# Patient Record
(demographics unavailable — no encounter records)

---

## 2024-12-04 NOTE — DISCUSSION/SUMMARY
[FreeTextEntry1] : 47M here to establish care for shortness of breath on exertion  #Shortness of breath on exertion  - Will check full PFTs/6MWT now - CXR ordered to evaluate for parenchymal lung disease - If there is suspicion for ILD, then will order CT chest to further evaluation - Offered to start empiric inhaler therapy but Pt declines - Declines flu shot

## 2024-12-04 NOTE — PHYSICAL EXAM
[No Acute Distress] : no acute distress [Normal Oropharynx] : normal oropharynx [II] : Mallampati Class: II [Normal Appearance] : normal appearance [No Neck Mass] : no neck mass [Normal Rate/Rhythm] : normal rate/rhythm [Normal S1, S2] : normal s1, s2 [No Murmurs] : no murmurs [No Resp Distress] : no resp distress [No Acc Muscle Use] : no acc muscle use [Clear to Auscultation Bilaterally] : clear to auscultation bilaterally [No Clubbing] : no clubbing [No Cyanosis] : no cyanosis [No Edema] : no edema [Normal Color/ Pigmentation] : normal color/ pigmentation [No Focal Deficits] : no focal deficits [Oriented x3] : oriented x3 [Normal Affect] : normal affect

## 2024-12-04 NOTE — HISTORY OF PRESENT ILLNESS
[Former] : former [Never] : never [TextBox_4] :  Mr. KATIANA CLARK is a 47 year old M here for evaluation of shortness of breath.  He has had longstanding shortness of breath for the past few years. 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 He notes that he has had shortness of breath on exertion for many years now but always ignored it but now that he is a father of 2 young children and would like to ensure he is in good health. He recently established care with [facility] at [facility] and was referred to Pulmonary. He was ordered for spirometry but notes he did it about 2-3 weeks after recovering from a viral URI and he cannot locate the report. He notes that his shortness of breath can be triggered by cold or humid weather but can also be sporadic. It mainly occurs with exertion and he feels he gets winded more easily. He has occasional associated chest discomfort/burning but he attributes that to severe GERD symptoms. He is awaiting endoscopy for evaluation. He does not have chronic cough, sputum production, hemoptysis, LE edema, or fevers/chills. 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  He previously smoked 1ppd x5 years, then 0.5ppd x5 yrs; quit in 2010 He has no FHx of lung ca, asthma, or COPD/emphysema No PSH NKDA